# Patient Record
Sex: MALE | Race: WHITE | Employment: FULL TIME | URBAN - METROPOLITAN AREA
[De-identification: names, ages, dates, MRNs, and addresses within clinical notes are randomized per-mention and may not be internally consistent; named-entity substitution may affect disease eponyms.]

---

## 2020-08-24 ENCOUNTER — HOSPITAL ENCOUNTER (EMERGENCY)
Facility: CLINIC | Age: 50
Discharge: HOME | End: 2020-08-24
Attending: PHYSICIAN ASSISTANT | Admitting: PHYSICIAN ASSISTANT
Payer: OTHER MISCELLANEOUS

## 2020-08-24 VITALS
HEART RATE: 80 BPM | OXYGEN SATURATION: 97 % | BODY MASS INDEX: 28.04 KG/M2 | SYSTOLIC BLOOD PRESSURE: 154 MMHG | DIASTOLIC BLOOD PRESSURE: 87 MMHG | HEIGHT: 68 IN | RESPIRATION RATE: 18 BRPM | WEIGHT: 185 LBS | TEMPERATURE: 98.4 F

## 2020-08-24 DIAGNOSIS — S61.211A LACERATION OF LEFT INDEX FINGER: ICD-10-CM

## 2020-08-24 PROCEDURE — 99284 EMERGENCY DEPT VISIT MOD MDM: CPT

## 2020-08-24 PROCEDURE — 12001 RPR S/N/AX/GEN/TRNK 2.5CM/<: CPT

## 2020-08-24 ASSESSMENT — ENCOUNTER SYMPTOMS
WOUND: 1
COUGH: 0
CHILLS: 0
SORE THROAT: 0
FEVER: 0
MYALGIAS: 1
SHORTNESS OF BREATH: 0

## 2020-08-24 ASSESSMENT — MIFFLIN-ST. JEOR: SCORE: 1663.65

## 2020-08-24 NOTE — LETTER
August 24, 2020      To Whom It May Concern:      Reddy White was seen in our Emergency Department today, 08/24/20. Please excuse him from work from 8/24-8/26. I expect his condition to improve over the next 5-7 days.  He may return to work/school when improved.    Sincerely,        Krupa Florez PA-C

## 2020-08-24 NOTE — ED PROVIDER NOTES
"History     Chief Complaint:  Laceration    The history is provided by the patient.     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 He states the tip of his finger feels numb but states he has normal movement of the digit. Patient notes his work is aware that he is here in the ED.    Tdap: 2014    Allergies:  No Known Allergies    Medications:   The patient is not currently taking any prescribed medications.    Medical History:   The patient denies any significant past medical history.    Surgical History   The patient does not have any pertinent past surgical history.    Family History:   No past pertinent family history.    Social History:  Accident happened while at work.    Review of Systems   Constitutional: Negative for chills and fever.   HENT: Negative for sore throat.    Respiratory: Negative for cough and shortness of breath.    Musculoskeletal: Positive for myalgias (left pointer finger).   Skin: Positive for wound (laceration to left pointer finger).   All other systems reviewed and are negative.    Physical Exam     Patient Vitals for the past 24 hrs:   BP Temp Temp src Pulse Resp SpO2 Height Weight   08/24/20 1507 (!) 154/87 98.4  F (36.9  C) Temporal 80 18 97 % 1.727 m (5' 8\") 83.9 kg (185 lb)       Physical Exam  General: Resting comfortably.  Alert and oriented.   Head:  The scalp, face, and head appear normal   Eyes:  Conjunctivae and sclerae are normal    CV:  Radial pulse intact to the left wrist.  Capillary refill is brisk in all digits of the distal left index finger.  Resp:  No tachypnea.  No respiratory distress.  MS:  The patient can flex and extend against resistance at the MCP, DIP, and PIP joints of the left index finger.  Skin:  2.5 cm flap like laceration noted to the distal/volar aspect of the left index " finger. No bony involvement. No tendon involvement. No foreign body  Neuro: Sensation decreased to distal left index finger,l but intact to light touch.    Emergency Department Course   Procedures    Laceration Repair        LACERATION:  A simple and superficial clean 2.5 cm laceration.      LOCATION:  Left index finger      FUNCTION:  Distally circulation, motor and tendon function are intact. Decreased sensation to left distal index finger      ANESTHESIA:  Digital block using 0.5% Bupivacaine total of 3 mLs      PREPARATION:  Irrigation and Scrubbing with Normal Saline and Shur Clens      DEBRIDEMENT:  no debridement      CLOSURE:  Wound was closed with One Layer.  Skin closed with 9 x 5.0 Ethylon using interrupted sutures.    Emergency Department Course:  Past medical records, nursing notes, and vitals reviewed.    3:14 PM I performed an exam of the patient as documented above.     1800 I rechecked the patient and discussed the results of his workup thus far. I repaired the patient's laceration, see procedure note above.    Findings and plan explained to the Patient. Patient discharged home with instructions regarding supportive care, medications, and reasons to return. The importance of close follow-up was reviewed.     I personally answered all related questions prior to discharge.     Impression & Plan   Medical Decision Making:  Reddy White is a 50 year old male presents for evaluation of a laceration to his left index finger.  See HPI for full details.  After anesthesia, and copious irrigation, the wound was carefully evaluated and explored. There was no foreign body identified. CMS is intact aside from decreased sensation to the left index finger which could indicate digital nerve disruption. There is no evidence of muscular, tendon or bony damage with this laceration. The laceration was closed as noted in the procedure note. The patient tolerated the procedure well.  Possible complications (infection,  scarring) were reviewed with the patient. Appropriate wound dressing was placed and daily cares were discussed. Splint was laced to expedite healing. Tetanus is up to date. he will be discharged home. he was asked to follow up with primary care in 12-14 days for suture removal.  Red flag symptoms, and reasons for return were discussed and understood.  All questions were answered prior to discharge. The patient understands and agrees to this plan.    Diagnosis:    ICD-10-CM    1. Laceration of left index finger  S61.211A        Disposition:  Discharged to home.    Scribe Disclosure:  Keyonna TAN, am serving as a scribe on 8/24/2020 at 3:58 PM to personally document services performed by Krupa Florez PA* based on my observations and the provider's statements to me.      Krupa Florez PA-C  08/24/20 2117

## 2024-11-01 NOTE — ED AVS SNAPSHOT
Emergency Department  [address] [place] [id number]  Phone:  [phone]  Fax:  [phone]                                    Reddy White   MRN: [id number]    Department:   Emergency Department   Date of Visit:  8/24/2020           After Visit Summary Signature Page    I have received my discharge instructions, and my questions have been answered. I have discussed any challenges I see with this plan with the nurse or doctor.    ..........................................................................................................................................  Patient/Patient Representative Signature      ..........................................................................................................................................  Patient Representative Print Name and Relationship to Patient    ..................................................               ................................................  Date                                   Time    ..........................................................................................................................................  Reviewed by Signature/Title    ...................................................              ..............................................  Date                                               Time          22EPIC Rev 08/18       
stop-smoking programs and medicines. These can increase your chances of quitting for good. Quitting is one of the most important things you can do to protect your heart. It is never too late to quit. Try to avoid secondhand smoke too.     Stay at a weight that's healthy for you. Talk to your doctor if you need help losing weight.     Try to get 7 to 9 hours of sleep each night.     Limit alcohol to 2 drinks a day for men and 1 drink a day for women. Too much alcohol can cause health problems.     Manage other health problems such as diabetes, high blood pressure, and high cholesterol. If you think you may have a problem with alcohol or drug use, talk to your doctor.   Medicines    Take your medicines exactly as prescribed. Call your doctor if you think you are having a problem with your medicine.     If your doctor recommends aspirin, take the amount directed each day. Make sure you take aspirin and not another kind of pain reliever, such as acetaminophen (Tylenol).   When should you call for help?   Call 911 if you have symptoms of a heart attack. These may include:    Chest pain or pressure, or a strange feeling in the chest.     Sweating.     Shortness of breath.     Pain, pressure, or a strange feeling in the back, neck, jaw, or upper belly or in one or both shoulders or arms.     Lightheadedness or sudden weakness.     A fast or irregular heartbeat.   After you call 911, the  may tell you to chew 1 adult-strength or 2 to 4 low-dose aspirin. Wait for an ambulance. Do not try to drive yourself.  Watch closely for changes in your health, and be sure to contact your doctor if you have any problems.  Where can you learn more?  Go to https://www.[employer].com.net/patientEd and enter F075 to learn more about \"A Healthy Heart: Care Instructions.\"  Current as of: June 24, 2023  Content Version: 14.2  © 2024 [employer].   Care instructions adapted under license by [employer]. If you have questions